# Patient Record
Sex: FEMALE | Race: WHITE | NOT HISPANIC OR LATINO | Employment: OTHER | URBAN - METROPOLITAN AREA
[De-identification: names, ages, dates, MRNs, and addresses within clinical notes are randomized per-mention and may not be internally consistent; named-entity substitution may affect disease eponyms.]

---

## 2017-12-04 ENCOUNTER — MEDICAL CORRESPONDENCE (OUTPATIENT)
Dept: HEALTH INFORMATION MANAGEMENT | Facility: CLINIC | Age: 62
End: 2017-12-04

## 2017-12-19 ENCOUNTER — OFFICE VISIT (OUTPATIENT)
Dept: PEDIATRIC HEMATOLOGY/ONCOLOGY | Facility: CLINIC | Age: 62
End: 2017-12-19
Attending: MEDICAL GENETICS
Payer: COMMERCIAL

## 2017-12-19 VITALS
SYSTOLIC BLOOD PRESSURE: 130 MMHG | BODY MASS INDEX: 29.31 KG/M2 | DIASTOLIC BLOOD PRESSURE: 71 MMHG | HEIGHT: 65 IN | WEIGHT: 175.93 LBS

## 2017-12-19 DIAGNOSIS — Q85.01 NEUROFIBROMATOSIS, PERIPHERAL, NF1 (H): Primary | ICD-10-CM

## 2017-12-19 DIAGNOSIS — Q85.01 NEUROFIBROMATOSIS, TYPE 1 (H): Primary | ICD-10-CM

## 2017-12-19 PROCEDURE — 96040 ZZH GENETIC COUNSELING, EACH 30 MINUTES: CPT | Mod: ZF | Performed by: GENETIC COUNSELOR, MS

## 2017-12-19 PROCEDURE — 88230 TISSUE CULTURE LYMPHOCYTE: CPT | Performed by: MEDICAL GENETICS

## 2017-12-19 PROCEDURE — 81408 MOPATH PROCEDURE LEVEL 9: CPT | Performed by: MEDICAL GENETICS

## 2017-12-19 PROCEDURE — 99214 OFFICE O/P EST MOD 30 MIN: CPT | Mod: ZF

## 2017-12-19 PROCEDURE — 84999 UNLISTED CHEMISTRY PROCEDURE: CPT | Performed by: MEDICAL GENETICS

## 2017-12-19 PROCEDURE — 36415 COLL VENOUS BLD VENIPUNCTURE: CPT | Performed by: MEDICAL GENETICS

## 2017-12-19 PROCEDURE — 81404 MOPATH PROCEDURE LEVEL 5: CPT | Performed by: MEDICAL GENETICS

## 2017-12-19 PROCEDURE — 81407 MOPATH PROCEDURE LEVEL 8: CPT | Performed by: MEDICAL GENETICS

## 2017-12-19 ASSESSMENT — PAIN SCALES - GENERAL: PAINLEVEL: NO PAIN (0)

## 2017-12-19 NOTE — PROGRESS NOTES
2017    Presenting Information:   I met with Abi Alex  for genetic counseling in  The neurofibromatosis clinic  at [facility] to discuss her personal and family history of neurofibromatosis type, 1( NF1).  She is here today to review this history, screening recommendations, and available genetic testing options. Josie Arellano, referred Abi for genetic testing.  Dr. Arellano did a physical exam today on Abi.    Personal History:  Abi is a 62 year old female.  She was diagnosed with NF1 when she had her first child, 44 years ago.  It was a clinical diagnosis at that time.  Dr. Arellano examined her today and she has a mild form of NF1.    Family History: (Please see scanned pedigree for detailed family history information)    Her father   at age 55 of sepsis. He was diagnosed with a brain tumor at age 44. It is not known if he had NF1.    A paternal uncle had tumors on his neck.  He  at age 60.    A paternal cousin  of breast cancer in her 60's.    Her daughter was diagnosed clinically with NF1 and by report has 4 children with cafe au lait spots. She is 44.    Her other daughter, age 42,  also has cafe au lait spots but it is not known if she has NF1.    Her mother  of breast cancer at age 50. She was diagnosed at age 49.    Her maternal grandmother  in her 70's from chronic leukemia.    Her brother has peroneal muscle atrophy. It is not known if he has NF1.  His daughter was diagnosed with Hodgkin's lymphoma in her 20's.    Her maternal  ethnicity is English, Burkinan and Sri [place]an. Her paternal ethnicity is Burkinan.   There is no reported consanguinity.    Discussion:    We briefly reviewed  the genetics and inheritance of NF1.  NF1 is an autosomal dominant condition, which means that a person only needs one copy of a gene with a mutation in the NF1 gene to have this condition. It also means that if a parent has this condition, each child has a 50% chance of inheriting the  same condition.      Genetic testing is available for this condition in NF1 gene.   The genes that are tested are the NF1 gene and the SPRED1 gene. The SPRED1 gene has cafe au lait spots and other features  similar to NF1.    The symptoms of NF1 can vary within families who have the same mutation in the NF1 gene. NF1 can be associated with cafe-au-lait spots, bony problems, tumors on the nerves, optic tumors in children and possibly brain tumors. It can also be associated with learning problems.    Testing:     Consent was obtained and genetic testing for NF1 and SPRED1 was sent to the [facility].     Abi needs to know if her insurance will cover the cost of the test before it is actually run.     Information from this test will determine the type of follow-up that Abi will need with NF1.    Plan:  1. Blood was sent to the [facility] to test for the NF1 and SPRED1 genes.  2. A prior authorization will be done to determine if the test is covered by insurance. Abi will be notified when this is complete.  3. I will contact Abi when testing is complete.      Christine Chau MS [facility]   Genetic Counselor  [phone]    Time spent in consultation face to face was 17 minutes.     CC: patient

## 2017-12-19 NOTE — MR AVS SNAPSHOT
After Visit Summary   12/19/2017    Abi Alex    MRN: [id number]           Patient Information     Date Of Birth          1955        Visit Information        Provider Department      12/19/2017 1:45 PM Pippa Arellano MD [facility] PEDS NEUROFIBROMATOSIS        Today's Diagnoses     Neurofibromatosis, type 1 (H)    -  1      Care Instructions    It was a pleasure to see you in our Neurofibromatosis clinic today.  Here's our recommendations for follow-up care:    Referrals/Tests:  Audiology Referral    Other Instructions:    Influenza vaccine every year in the fall.    Ophthalmology (eye MD) exam every year.    Annual physical with your Primary Care Provider.    Return to Clinic:  2 years, or sooner if needed. You will get a reminder letter to schedule this appointment.    ------------------------------------------------------------------------------------------------------------------------------    Neurofibromatosis (NF) Clinic  [facility], 9th Floor - [address] [address]  Scheduling/Appointments: [phone]  Fax: [phone]    Numbers to call:   Monday - Friday, 8:00 am - 5:00 pm:    Non-urgent or same-day call-back concerns: [facility] Nurse Triage - Voicemail: [phone]    Urgent concerns: NF Care Coordinator - Jennifer Magaña RN - Pager: [phone] (If you don't get a call back within 15-30 minutes, then Jennifer is off and you should call [phone]).    Scheduling/Appointments: [phone]  Nights and weekends:   Call [phone] and ask the  to page the 'Pediatric Heme/Onc fellow on call' if you have an urgent concern that can't wait until the clinic opens.  Genetic Counselor:  Christine Kandi, CGC: [phone]    Jennifer Magaña RN, MS  NF Care Coordinator  Pager: [phone]  E-mail: [email or web address]                  Follow-ups after your visit        Additional Services      AUDIOLOGY ADULT REFERRAL       Your provider has referred you to: [facility], [facility] [phone] Fax [phone]    Specialty Testing:  Audiogram w/ Tymps and Reflexes (Comprehensive Audiology Evaluation).                  Follow-up notes from your care team     Return in about 2 years (around 12/19/2019) for NF Clinic, NF1 Follow-Up,  Scheduling.      Your next 10 appointments already scheduled     Feb 01, 2018 10:00 AM CST   Walk In From ENT with Ant Crow   [facility] Audiology ([facility])    [address] [place] [id number] [phone]            Feb 01, 2018 11:15 AM CST   (Arrive by 11:00 AM)   New Patient Visit with Eloy Smiley MD   [facility] Ear Nose and Throat ([facility])    [address] [place] [id number] [phone]              Future tests that were ordered for you today     Open Future Orders        Priority Expected Expires Ordered    AUDIOLOGY ADULT REFERRAL Routine 12/26/2017 5/19/2018 12/19/2017            Who to contact     Please call your clinic at [phone] to:    Ask questions about your health    Make or cancel appointments    Discuss your medicines    Learn about your test results    Speak to your doctor   If you have compliments or concerns about an experience at your clinic, or if you wish to file a complaint, please contact [facility] Patient Relations at [phone] or email us at [email or web address]         Additional Information About Your Visit        Osmetechhart Information     WikiYou is an electronic gateway that provides easy, online access to your medical records. With WikiYou, you can request a clinic appointment, read your test results, renew a prescription or communicate with your care team.     To sign up for [employer] visit the website at [email or web address]   You will be  "asked to enter the access code listed below, as well as some personal information. Please follow the directions to create your username and password.     Your access code is: [email or web address]  Expires: 3/19/2018  3:35 PM     Your access code will  in 90 days. If you need help or a new code, please contact your [facility] or call [phone] for assistance.        Care EveryWhere ID     This is your Care EveryWhere ID. This could be used by other organizations to access your [place] medical records  [id number]        Your Vitals Were     Height Head Circumference BMI (Body Mass Index)             1.654 m (5' 5.12\") 54.6 cm (21.5\") 29.17 kg/m2          Blood Pressure from Last 3 Encounters:   17 130/71    Weight from Last 3 Encounters:   17 79.8 kg (175 lb 14.8 oz)               Primary Care Provider Office Phone # Fax #    Haley Jaime [phone]       [address] [address]        Equal Access to Services     [facility]: Hadii aad ku hadasho Soomaali, waaxda luqadaha, qaybta kaalmada adeegyada, tanmay white haybladimirn adeeg lorraine villegas . So [facility] [phone].    ATENCIÓN: Si habla español, tiene a aiken disposición servicios gratuitos de asistencia lingüística. Llame al [phone].    We comply with applicable federal civil rights laws and [place] laws. We do not discriminate on the basis of race, color, national origin, age, disability, sex, sexual orientation, or gender identity.            Thank you!     [facility]. Our goal is always to provide you with excellent care. Hearing back from our patients is one way we can continue to improve our services. Please take a few minutes to complete the written survey that you may receive in the mail after your visit with us. Thank you!             Your Updated Medication List - Protect others around you: Learn " how to safely use, store and throw away your medicines at www.disposemymeds.org.      Notice  As of 12/19/2017  3:35 PM    You have not been prescribed any medications.

## 2017-12-19 NOTE — PATIENT INSTRUCTIONS
It was a pleasure to see you in our Neurofibromatosis clinic today.  Here's our recommendations for follow-up care:    Referrals/Tests:  Audiology Referral    Other Instructions:    Influenza vaccine every year in the fall.    Ophthalmology (eye MD) exam every year.    Annual physical with your Primary Care Provider.    Return to Clinic:  2 years, or sooner if needed. You will get a reminder letter to schedule this appointment.    ------------------------------------------------------------------------------------------------------------------------------    Neurofibromatosis (NF) Clinic  [facility], 9th Floor - [address] [address]  Scheduling/Appointments: [phone]  Fax: [phone]    Numbers to call:   Monday - Friday, 8:00 am - 5:00 pm:    Non-urgent or same-day call-back concerns: [facility] Nurse Triage - Voicemail: [phone]    Urgent concerns: NF Care Coordinator - Jennifer Magaña RN - Pager: [phone] (If you don't get a call back within 15-30 minutes, then Jennifer is off and you should call [phone]).    Scheduling/Appointments: [phone]  Nights and weekends:   Call [phone] and ask the  to page the 'Pediatric Heme/Onc fellow on call' if you have an urgent concern that can't wait until the clinic opens.  Genetic Counselor:  Christine Chau CGC: [phone]    Jennifer Magaña RN, MS  NF Care Coordinator  Pager: [phone]  E-mail: [email or web address]

## 2017-12-19 NOTE — LETTER
2017    RE: Abi Alex  [address]  [place] [facility] [address]     2017    Presenting Information:   I met with Abi Alex  for genetic counseling in  The neurofibromatosis clinic  at [facility] to discuss her personal and family history of neurofibromatosis type, 1( NF1).  She is here today to review this history, screening recommendations, and available genetic testing options. Josie Arellano, referred Abi for genetic testing.  Dr. Arellano did a physical exam today on Abi.    Personal History:  Abi is a 62 year old female.  She was diagnosed with NF1 when she had her first child, 44 years ago.  It was a clinical diagnosis at that time.  Dr. Arellano examined her today and she has a mild form of NF1.    Family History: (Please see scanned pedigree for detailed family history information)    Her father   at age 55 of sepsis. He was diagnosed with a brain tumor at age 44. It is not known if he had NF1.    A paternal uncle had tumors on his neck.  He  at age 60.    A paternal cousin  of breast cancer in her 60's.    Her daughter was diagnosed clinically with NF1 and by report has 4 children with cafe au lait spots. She is 44.    Her other daughter, age 42,  also has cafe au lait spots but it is not known if she has NF1.    Her mother  of breast cancer at age 50. She was diagnosed at age 49.    Her maternal grandmother  in her 70's from chronic leukemia.    Her brother has peroneal muscle atrophy. It is not known if he has NF1.  His daughter was diagnosed with Hodgkin's lymphoma in her 20's.    Her maternal  ethnicity is English, Korean and Martiniquais. Her paternal ethnicity is Korean.   There is no reported consanguinity.    Discussion:    We briefly reviewed  the genetics and inheritance of NF1.  NF1 is an autosomal dominant condition, which means that a person only needs one copy of a gene with a mutation in the NF1 gene to have this condition. It also  means that if a parent has this condition, each child has a 50% chance of inheriting the same condition.      Genetic testing is available for this condition in NF1 gene.   The genes that are tested are the NF1 gene and the SPRED1 gene. The SPRED1 gene has cafe au lait spots and other features  similar to NF1.    The symptoms of NF1 can vary within families who have the same mutation in the NF1 gene. NF1 can be associated with cafe-au-lait spots, bony problems, tumors on the nerves, optic tumors in children and possibly brain tumors. It can also be associated with learning problems.    Testing:     Consent was obtained and genetic testing for NF1 and SPRED1 was sent to the [facility].     Abi needs to know if her insurance will cover the cost of the test before it is actually run.     Information from this test will determine the type of follow-up that Abi will need with NF1.    Plan:  1. Blood was sent to the [facility] to test for the NF1 and SPRED1 genes.  2. A prior authorization will be done to determine if the test is covered by insurance. Abi will be notified when this is complete.  3. I will contact Abi when testing is complete.      Christine Chau, MS [facility]   Genetic Counselor  [phone]    Time spent in consultation face to face was 17 minutes.     CC: patient

## 2017-12-19 NOTE — LETTER
2017      RE: Abi Alex  [address]  [place] [address]       NEUROFIBROMATOSIS CLINIC CONSULTATION     Name:  Abi Alex  :   1955  MRN:   [id number]  Date of service: Dec 19, 2017  Primary Provider: Haley Jaime  Referring Provider: Haley Jaime    Reason for consultation:  A consultation in the [facility] Neurofibromatosis Clinic was requested by Haley Jaime for Abi, a 62 year old female, for evaluation of her known diagnosis of neurofibromatosis.  . She also saw our genetic counselor and nurse coordinator at this visit.       Assessment:    Abi has neurofibromatosis. She has relatively mild skin manifestations with no neurofibroma  but has fairly extensive sun damage with generalized mentoring enhancement that mask café au lait spots on her trunk and arms.    In order to make a definitive diagnosis of Neurofibromatosis type 1 (NF1), 2 out of 7 possible criteria must be met.   1. 6 or more café au lait macules (5mm or great in prepubertal child)  2. axillary or inguinal freckling   3. Lisch nodules of the iris   4. optic glioma   5. 2 or more neurofibromata or 1 plexiform neurofibroma   6. characteristic bony lesion such as tibial pseudarthrosis   7. family history of NF1 in a first degree relative.     Abi satisfies criteria # 1,2,3, and 7 and thus merits a formal diagnosis of NF1.     NF1 occurs in approximately 3000 individuals, and for most it is a mild disorder that causes spots and some benign nerve sheath tumors (neurofibromata).  However, there are many complications that can occur in NF1.     The two most common complications are hypertension and scoliosis.  Today, we found a normal blood pressure on  Abi. We checked Abi s spine and found no scoliosis.      There are many other less common complications of NF1, and the only way to screen for these is with a complete review of symptoms and physical examination, both of  which were performed today and reveal no apparent problems. At Abi s age, I recommend surveillance for the complications of NF1 be done every 1-2 years.  I would be happy to provide this.     NF1 is inherited in autosomal dominant fashion.  Autosomal dominant inheritance was reviewed with Abi.  Approximately half of cases of NF1 are new in the family and represent new gene mutations, and half of cases are inherited from an affected parent.  Based on Abi's history, it seems likely that both of her daughters inherited this gene from her. We are happy to see them as well should that be desired    Gene testing is available for NF1.  It can detect 95% of mutations in the NF1 gene.  I discussed the merits and disadvantages of pursuing genetic testing with Abi and she chose to pursue NF1 mutation analysis.      Plan:     Abi expressed some concern about not being able to hear as well that she had been. Because of this, I recommended audiology assessment   Ordered at this visit:   Orders Placed This Encounter   Procedures     AUDIOLOGY ADULT REFERRAL     GENETIC COUNSELING SERVICES       Genetic counseling consultation with Christine Chau MS, [facility] to obtain a pedigree and for genetic counseling regarding autosomal dominant inheritance.   Return to the NF Clinic in 2 years for follow-up.      -----  History of Present Illness:  Patient Active Problem List   Diagnosis     Neurofibromatosis, type 1 (H)      Abi reports that she was diagnosed in about 1972 at the time of her first pregnancy. Her daughter had café au lait spots at birth and this observation,, coupled with her own physical exam prompted that diagnosis to be established many years ago. She herself has not had much of the way of complications of her condition. Recently, she went for physical examination and was noted to have skin tags underneath her breasts. She asked her premier provider of these could be related to her NF. While the skin tags proved not  to be, it was recommended that she be seen again for follow-up of her long-standing medical condition.    Abi expressed some concern about progress for her daughter Angeline. She reports that her daughter has a tumor behind her ear. Both Angeline and her other daughter also had café au lait spots.  .     Review of available medical records:  Pertinent studies/abnormal test results:  No previous testing related to NF  Imaging results:  No previous testing related to NF    Past Medical History:  Hospitalization History: Abi has not had medical hospitalization for the last 10 years    Surgical History: had a hysterectomy for prolonged bleeding in the early 1980s. She had previously had a tubal ligation as well.    Other health services currently received are primary care, no other specialty care    Review of Systems:  Constitional:  Regards herself as generally healthy  Eyes: negative - normal vision - wears glasses and these were checked about six months ago. She's not sure she sees as well now after receiving this new pair but is reluctant to seek follow-up because only one visit per year is paid for by her insurance  Ears/Nose/Throat: indicates she feels that she doesn't hear as well as she used to  Respiratory: negative  Cardiovascular: negative  Gastrointestinal: negative  Genitourinary: negative  Hematologic/Lymphatic: negative  Allergy/Immunologic: negative - no drug allergies  Musculoskeletal: negative  Endocrine:  On thyroxine for hypothyroidism  Integument:  See above - does not have neurofibromata  Neurologic:  Has occasional headaches. She thinks she could have had an MRI many years ago but does not recall any results specifically  Psychiatric: negative    Remainder of comprehensive review of systems is complete and negative.    Personal History  Family History:    A detailed pedigree was obtained at the time of this appointment and is available in the chart.  Family history is significant for other  "family members who may have NF. She questions whether her father might have had the condition; he had a brain tumor. His brother had neck tumors. As noted, her two daughters have café au lait spots..  Maternal ethnicity is  English, South Korean, and Portuguese.  Paternal ethnicity is  South Korean.  Consanguinity  denied.  Remainder of history was non-contributory.    Social History:    Abi lives at home with her . 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  Current insurance status commercial/private.     I have reviewed Abi s past medical history, family history, social history, medications and allergies as documented in the electronic medical record.  There were no additional findings except as noted.    Medications:  No current outpatient prescriptions on file.     Allergies:  No Known Allergies    Physical Examination:  Blood pressure 130/71, height 5' 5.12\" (165.4 cm), weight 175 lb 14.8 oz (79.8 kg), head circumference 54.6 cm (21.5\").  Constitutional: This was a well-developed, well nourished female who responded appropriately to all requests during the examination.    Head and Neck:  She had hair of normal texture and distribution and her head was proportionate in appearance.  The face was symmetric and did not have dysmorphic features.   Eyes:  The pupils were equal, round, and reacted to light.   The conjunctivae were clear.   Ears:  Her ears were normal in architecture and placement.   Nose: The nose was clear.    Mouth and Throat: The throat was without erythema.    Respiratory: The chest was clear to auscultation and had a symmetric appearance.    Cardiovascular:  On examination of the heart, the rhythm was regular and there was no murmur.  The peripheral pulses were normal.    Gastrointestinal: The abdomen was soft and had normal bowel sounds.  There was no " hepatosplenomegaly.    : I deferred a  examination.   Musculoskeletal: There was a full range of motion on the extremity exam, and normal muscular volume and bulk. There was no evidence of scoliosis.   Neurologic: The neurologic exam was normal, with normal cranial nerves, normal deep tendon reflexes, normal sensation, and a normal gait. She had normal tone.   Integument:  Abi has exaggerated sun damage over most of her trunk arms and lower legs.  She has café au lait spots easily seen on her upper thighs and lower back in areas not exposed as heavily to sun. She has freckling in her axillae and in her inguinal area. I did not palpate nor see obvious neurofibromata. She does not have areolar neurofibromas The nails were normal in architecture.  She had normal dermatoglyphics.  ---  JOE SOTO M.D.  Professor and Director   Division of Genetics and Metabolism  Department of Pediatrics  [facility]    Routed to family in Comm [place]  Also to  Haley Jaime Elizabeth A Koffel    Parent(s) of Abi Alex  [address]  [place] [address]

## 2017-12-19 NOTE — NURSING NOTE
"Chief Complaint   Patient presents with     New Patient     New patient here today for skin bumps/ family history of NF     /71 (BP Location: Left arm, Patient Position: Fowlers, Cuff Size: Adult Regular)  Ht 1.654 m (5' 5.12\")  Wt 79.8 kg (175 lb 14.8 oz)  HC 54.6 cm (21.5\")  BMI 29.17 kg/m2  Jessa De La Vega, ISAIAS  December 19, 2017    "

## 2017-12-19 NOTE — MR AVS SNAPSHOT
After Visit Summary   12/19/2017    Abi Alex    MRN: [id number]           Patient Information     Date Of Birth          1955        Visit Information        Provider Department      12/19/2017 1:45 PM Christine Chau GC [facility] PEDS NEUROFIBROMATOSIS        Today's Diagnoses     Neurofibromatosis, peripheral, NF1 (H)    -  1       Follow-ups after your visit        Your next 10 appointments already scheduled     Feb 01, 2018 10:00 AM CST   Walk In From ENT with Ant Crow   [facility] ([facility])    [address] [place] [id number] [phone]            Feb 01, 2018 11:15 AM CST   (Arrive by 11:00 AM)   New Patient Visit with Eloy Smiley MD   [facility] Nose [facility]roat ([facility])    [address] [place] [id number] [phone]              Future tests that were ordered for you today     Open Future Orders        Priority Expected Expires Ordered    AUDIOLOGY ADULT REFERRAL Routine 12/26/2017 5/19/2018 12/19/2017            Who to contact     Please call your clinic at [phone] to:    Ask questions about your health    Make or cancel appointments    Discuss your medicines    Learn about your test results    Speak to your doctor   If you have compliments or concerns about an experience at your clinic, or if you wish to file a complaint, please contact [facility] Patient Relations at [phone] or email us at [email or web address]         Additional Information About Your Visit        MyChart Information     BookitNow! is an electronic gateway that provides easy, online access to your medical records. With BookitNow!, you can request a clinic appointment, read your test results, renew a prescription or communicate with your care team.     To sign up for Solxt visit the website at [email or web address]   You will  be asked to enter the access code listed below, as well as some personal information. Please follow the directions to create your username and password.     Your access code is: [email or web address]  Expires: 3/19/2018  3:35 PM     Your access code will  in 90 days. If you need help or a new code, please contact your [facility] or call [phone] for assistance.        Care EveryWhere ID     This is your Care EveryWhere ID. This could be used by other organizations to access your [place] medical records  [id number]         Blood Pressure from Last 3 Encounters:   17 130/71    Weight from Last 3 Encounters:   17 79.8 kg (175 lb 14.8 oz)              We Performed the Following     Send outs misc test     [facility] NFSP1-R, NF1 and SPRED1 genes: Laboratory Miscellaneous Order        Primary Care Provider Office Phone # Fax #    Haley Jaime [phone]       [address] [address]        Equal Access to Services     MICHELLE MOORE : Hadii aad ku hadasho Soomaali, waaxda luqadaha, qaybta kaalmada adeegyada, tanmay villegas . So [facility] [phone].    ATENCIÓN: Si habla español, tiene a aiken disposición servicios gratuitos de asistencia lingüística. Florinda al [phone].    We comply with applicable federal civil rights laws and [place] laws. We do not discriminate on the basis of race, color, national origin, age, disability, sex, sexual orientation, or gender identity.            Thank you!     [facility] your care. Our goal is always to provide you with excellent care. Hearing back from our patients is one way we can continue to improve our services. Please take a few minutes to complete the written survey that you may receive in the mail after your visit with us. Thank you!             Your Updated Medication List - Protect others around  you: Learn how to safely use, store and throw away your medicines at www.disposemymeds.org.      Notice  As of 12/19/2017 11:59 PM    You have not been prescribed any medications.

## 2017-12-19 NOTE — PROGRESS NOTES
NEUROFIBROMATOSIS CLINIC CONSULTATION     Name:  Abi Alex  :   1955  MRN:   [id number]  Date of service: Dec 19, 2017  Primary Provider: Haley Jaime  Referring Provider: Haley Jaime    Reason for consultation:  A consultation in the [facility] Neurofibromatosis Clinic was requested by Haley Jaime for Abi, a 62 year old female, for evaluation of her known diagnosis of neurofibromatosis.  . She also saw our genetic counselor and nurse coordinator at this visit.       Assessment:    Abi has neurofibromatosis. She has relatively mild skin manifestations with no neurofibroma  but has fairly extensive sun damage with generalized mentoring enhancement that mask café au lait spots on her trunk and arms.    In order to make a definitive diagnosis of Neurofibromatosis type 1 (NF1), 2 out of 7 possible criteria must be met.   1. 6 or more café au lait macules (5mm or great in prepubertal child)  2. axillary or inguinal freckling   3. Lisch nodules of the iris   4. optic glioma   5. 2 or more neurofibromata or 1 plexiform neurofibroma   6. characteristic bony lesion such as tibial pseudarthrosis   7. family history of NF1 in a first degree relative.     Abi satisfies criteria # 1,2,3, and 7 and thus merits a formal diagnosis of NF1.     NF1 occurs in approximately 3000 individuals, and for most it is a mild disorder that causes spots and some benign nerve sheath tumors (neurofibromata).  However, there are many complications that can occur in NF1.     The two most common complications are hypertension and scoliosis.  Today, we found a normal blood pressure on  Abi. We checked Abi s spine and found no scoliosis.      There are many other less common complications of NF1, and the only way to screen for these is with a complete review of symptoms and physical examination, both of which were performed today and reveal no apparent problems. At Abi s age, I recommend  surveillance for the complications of NF1 be done every 1-2 years.  I would be happy to provide this.     NF1 is inherited in autosomal dominant fashion.  Autosomal dominant inheritance was reviewed with Abi.  Approximately half of cases of NF1 are new in the family and represent new gene mutations, and half of cases are inherited from an affected parent.  Based on Abi's history, it seems likely that both of her daughters inherited this gene from her. We are happy to see them as well should that be desired    Gene testing is available for NF1.  It can detect 95% of mutations in the NF1 gene.  I discussed the merits and disadvantages of pursuing genetic testing with Abi and she chose to pursue NF1 mutation analysis.      Plan:     Abi expressed some concern about not being able to hear as well that she had been. Because of this, I recommended audiology assessment   Ordered at this visit:   Orders Placed This Encounter   Procedures     AUDIOLOGY ADULT REFERRAL     GENETIC COUNSELING SERVICES       Genetic counseling consultation with Christine Chau [place] [facility] [place] and for genetic counseling regarding autosomal dominant inheritance.   Return to the NF Clinic in 2 years for follow-up.      -----  History of Present Illness:  Patient Active Problem List   Diagnosis     Neurofibromatosis, type 1 (H)      Abi reports that she was diagnosed in about 1972 at the time of her first pregnancy. Her daughter had café au lait spots at birth and this observation,, coupled with her own physical exam prompted that diagnosis to be established many years ago. She herself has not had much of the way of complications of her condition. Recently, she went for physical examination and was noted to have skin tags underneath her breasts. She asked her premier provider of these could be related to her NF. While the skin tags proved not to be, it was recommended that she be seen again for follow-up of her long-standing  medical condition.    Abi expressed some concern about progress for her daughter Angeline. She reports that her daughter has a tumor behind her ear. Both Angeline and her other daughter also had café au lait spots.  .     Review of available medical records:  Pertinent studies/abnormal test results:  No previous testing related to NF  Imaging results:  No previous testing related to NF    Past Medical History:  Hospitalization History: Abi has not had medical hospitalization for the last 10 years    Surgical History: had a hysterectomy for prolonged bleeding in the early 1980s. She had previously had a tubal ligation as well.    Other health services currently received are primary care, no other specialty care    Review of Systems:  Constitional:  Regards herself as generally healthy  Eyes: negative - normal vision - wears glasses and these were checked about six months ago. She's not sure she sees as well now after receiving this new pair but is reluctant to seek follow-up because only one visit per year is paid for by her insurance  Ears/Nose/Throat: indicates she feels that she doesn't hear as well as she used to  Respiratory: negative  Cardiovascular: negative  Gastrointestinal: negative  Genitourinary: negative  Hematologic/Lymphatic: negative  Allergy/Immunologic: negative - no drug allergies  Musculoskeletal: negative  Endocrine:  On thyroxine for hypothyroidism  Integument:  See above - does not have neurofibromata  Neurologic:  Has occasional headaches. She thinks she could have had an MRI many years ago but does not recall any results specifically  Psychiatric: negative    Remainder of comprehensive review of systems is complete and negative.    Personal History  Family History:    A detailed pedigree was obtained at the time of this appointment and is available in the chart.  Family history is significant for other family members who may have NF. She questions whether her father might have had the  "condition; he had a brain tumor. His brother had neck tumors. As noted, her two daughters have café au lait spots..  Maternal ethnicity is  English, [place], and [place].  Paternal ethnicity is  [place].  Consanguinity  denied.  Remainder of history was non-contributory.    Social History:    Abi lives at home with her . 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  Current insurance status commercial/private.     I have reviewed Abi s past medical history, family history, social history, medications and allergies as documented in the electronic medical record.  There were no additional findings except as noted.    Medications:  No current outpatient prescriptions on file.     Allergies:  No Known Allergies    Physical Examination:  Blood pressure 130/71, height 5' 5.12\" (165.4 cm), weight 175 lb 14.8 oz (79.8 kg), head circumference 54.6 cm (21.5\").  Constitutional: This was a well-developed, well nourished female who responded appropriately to all requests during the examination.    Head and Neck:  She had hair of normal texture and distribution and her head was proportionate in appearance.  The face was symmetric and did not have dysmorphic features.   Eyes:  The pupils were equal, round, and reacted to light.   The conjunctivae were clear.   Ears:  Her ears were normal in architecture and placement.   Nose: The nose was clear.    Mouth and Throat: The throat was without erythema.    Respiratory: The chest was clear to auscultation and had a symmetric appearance.    Cardiovascular:  On examination of the heart, the rhythm was regular and there was no murmur.  The peripheral pulses were normal.    Gastrointestinal: The abdomen was soft and had normal bowel sounds.  There was no hepatosplenomegaly.    : I deferred a  examination.   Musculoskeletal: There was a full range of " motion on the extremity exam, and normal muscular volume and bulk. There was no evidence of scoliosis.   Neurologic: The neurologic exam was normal, with normal cranial nerves, normal deep tendon reflexes, normal sensation, and a normal gait. She had normal tone.   Integument:  Abi has exaggerated sun damage over most of her trunk arms and lower legs.  She has café au lait spots easily seen on her upper thighs and lower back in areas not exposed as heavily to sun. She has freckling in her axillae and in her inguinal area. I did not palpate nor see obvious neurofibromata. She does not have areolar neurofibromas The nails were normal in architecture.  She had normal dermatoglyphics.  ---  JOE SOTO M.D.  Professor and Director   Division of Genetics and Metabolism  Department of Pediatrics  [facility]    Routed to family in [facility]  Also to  Haley Jaime Elizabeth A Koffel

## 2017-12-20 LAB — MISCELLANEOUS TEST: NORMAL

## 2018-01-08 ENCOUNTER — TELEPHONE (OUTPATIENT)
Dept: CONSULT | Facility: CLINIC | Age: 63
End: 2018-01-08

## 2018-01-08 NOTE — TELEPHONE ENCOUNTER
Notified Abi that we received prior authorization approval valid from 12/19/17 to 6/18/18. Authorization number is [id number]. Provided Abi with estimated out of pocket cost for testing. Abi expressed understanding and stated that she wants to continue with testing. We will call when results are available. Abi had no further questions.    Netta Celestin    Division of Genetics  [facility]  P: [phone]

## 2018-01-31 DIAGNOSIS — H90.8 MIXED HEARING LOSS: Primary | ICD-10-CM

## 2018-02-01 ENCOUNTER — OFFICE VISIT (OUTPATIENT)
Dept: AUDIOLOGY | Facility: CLINIC | Age: 63
End: 2018-02-01
Payer: COMMERCIAL

## 2018-02-01 ENCOUNTER — PRE VISIT (OUTPATIENT)
Dept: OTOLARYNGOLOGY | Facility: CLINIC | Age: 63
End: 2018-02-01

## 2018-02-01 ENCOUNTER — OFFICE VISIT (OUTPATIENT)
Dept: OTOLARYNGOLOGY | Facility: CLINIC | Age: 63
End: 2018-02-01
Payer: COMMERCIAL

## 2018-02-01 VITALS — HEIGHT: 65 IN | WEIGHT: 177 LBS | BODY MASS INDEX: 29.49 KG/M2

## 2018-02-01 DIAGNOSIS — Q85.01 NEUROFIBROMATOSIS, TYPE 1 (H): ICD-10-CM

## 2018-02-01 DIAGNOSIS — H90.3 SENSORINEURAL HEARING LOSS (SNHL) OF BOTH EARS: ICD-10-CM

## 2018-02-01 DIAGNOSIS — H93.13 TINNITUS OF BOTH EARS: ICD-10-CM

## 2018-02-01 DIAGNOSIS — H93.13 TINNITUS, BILATERAL: Primary | ICD-10-CM

## 2018-02-01 PROBLEM — G47.33 OSA ON CPAP: Status: ACTIVE | Noted: 2017-06-04

## 2018-02-01 RX ORDER — LEVOTHYROXINE SODIUM 125 UG/1
62.5 TABLET ORAL DAILY
COMMUNITY
Start: 2017-11-29

## 2018-02-01 RX ORDER — CHLORAL HYDRATE 500 MG
CAPSULE ORAL
COMMUNITY
Start: 2007-10-22

## 2018-02-01 RX ORDER — DIPHENOXYLATE HYDROCHLORIDE AND ATROPINE SULFATE 2.5; .025 MG/1; MG/1
TABLET ORAL
COMMUNITY
Start: 2007-10-22

## 2018-02-01 RX ORDER — TRIAMCINOLONE ACETONIDE 1 MG/G
OINTMENT TOPICAL
COMMUNITY
Start: 2017-09-22 | End: 2024-04-23

## 2018-02-01 ASSESSMENT — PAIN SCALES - GENERAL: PAINLEVEL: NO PAIN (0)

## 2018-02-01 NOTE — MR AVS SNAPSHOT
After Visit Summary   2/1/2018    Abi Alex    MRN: [id number]           Patient Information     Date Of Birth          1955        Visit Information        Provider Department      2/1/2018 11:15 AM Eloy Smiley MD [facility] Ear Nose [facility] Throat        Today's Diagnoses     Tinnitus, bilateral    -  1    Neurofibromatosis, type 1 (H)        Sensorineural hearing loss (SNHL) of both ears          Care Instructions    The patient presents with a history of sensorineural hearing loss, tinnitus and a history of neurofibromatosis.  The patient's physical examination shows no abnormalities of the ears.  Due to her family his of neurofibromatosis, she will be referred for an MRI scan of the head. She will have a repeat Audiogram and Tympanogram in one year to monitor her sensorineural hearing loss and she will avoid caffeine and high doses of aspirin products to minimize tinnitus. She will be seen again after her MRI scan of the head.           Follow-ups after your visit        Your next 10 appointments already scheduled     Feb 01, 2018 11:15 AM CST   (Arrive by 11:00 AM)   New Patient Visit with Eloy Smiley MD   [facility] ([facility])    [address] [address] [phone]            Feb 08, 2018  3:00 PM CST   (Arrive by 2:45 PM)   MR BRAIN W/O & W CONTRAST with UUMR2   [facility] [place] ([facility])    [address] [facility] [address] [phone]           Take your medicines as usual, unless your doctor tells you not to. Bring a list of your current medicines to your exam (including vitamins, minerals and over-the-counter drugs).  You will be given intravenous contrast for this exam. To prepare:   The day before your exam, drink extra fluids at least six 8-ounce glasses (unless your doctor tells you to restrict your  fluids).   Have a blood test (creatinine test) within 30 days of your exam. Go to your clinic or Diagnostic Imaging Department for this test.  The MRI machine uses a strong magnet. Please wear clothes without metal (snaps, zippers). A sweatsuit works well, or we may give you a hospital gown.  Please remove any body piercings and hair extensions before you arrive. You will also remove watches, jewelry, hairpins, wallets, dentures, partial dental plates and hearing aids. You may wear contact lenses, and you may be able to wear your rings. We have a safe place to keep your personal items, but it is safer to leave them at home.   **IMPORTANT** THE INSTRUCTIONS BELOW ARE ONLY FOR THOSE PATIENTS WHO HAVE BEEN TOLD THEY WILL RECEIVE SEDATION OR GENERAL ANESTHESIA DURING THEIR MRI PROCEDURE:  IF YOU WILL RECEIVE SEDATION (take medicine to help you relax during your exam):   You must get the medicine from your doctor before you arrive. Bring the medicine to the exam. Do not take it at home.   Arrive one hour early. Bring someone who can take you home after the test. Your medicine will make you sleepy. After the exam, you may not drive, take a bus or take a taxi by yourself.   No eating 8 hours before your exam. You may have clear liquids up until 4 hours before your exam. (Clear liquids include water, clear tea, black coffee and fruit juice without pulp.)  IF YOU WILL RECEIVE ANESTHESIA (be asleep for your exam):   Arrive 1 1/2 hours early. Bring someone who can take you home after the test. You may not drive, take a bus or take a taxi by yourself.   No eating 8 hours before your exam. You may have clear liquids up until 4 hours before your exam. (Clear liquids include water, clear tea, black coffee and fruit juice without pulp.)  Please call the Imaging Department at your exam site with any questions.            Feb 08, 2018  4:30 PM CST   (Arrive by 4:15 PM)   Return Visit with Eloy Smiley MD   [facility]  Nose and Throat ([facility])    [address] [id number] [phone]            Feb 01, 2019  9:00 AM CST   Walk In From ENT with Ant Phan [facility]iology ([facility])    [address] [place] [id number] [phone]            Feb 01, 2019 10:00 AM CST   (Arrive by 9:45 AM)   Return Visit with Eloy Smiley MD   [facility] Nose and Th[facility]at ([facility])    [address] [place] [id number] [phone]              Future tests that were ordered for you today     Open Future Orders        Priority Expected Expires Ordered    Urea nitrogen Routine  2/1/2019 2/1/2018    Creatinine Routine  2/1/2019 2/1/2018    MRI Brain and Skull Base w & w/o contrast Routine  2/1/2019 2/1/2018            Who to contact     Please call your clinic at [phone] to:    Ask questions about your health    Make or cancel appointments    Discuss your medicines    Learn about your test results    Speak to your doctor   If you have compliments or concerns about an experience at your clinic, or if you wish to file a complaint, please contact [facility] Patient Relations at [phone] or email us at [email or web address]         Additional Information About Your Visit        [email or web address] Information     SmartShoot gives you secure access to your electronic health record. If you see a primary care provider, you can also send messages to your care team and make appointments. If you have questions, please call your primary care clinic.  If you do not have a primary care provider, please call [phone] and they will assist you.      SmartShoot is an electronic gateway that provides easy, online access to your medical records. With SmartShoot, you can request a clinic appointment, read your test results, renew a prescription or  "communicate with your care team.     To access your existing account, please contact your [facility] or call [phone] for assistance.        Care EveryWhere ID     This is your Care EveryWhere ID. This could be used by other organizations to access your [place] medical records  [id number]        Your Vitals Were     Height BMI (Body Mass Index)                1.66 m (5' 5.35\") 29.14 kg/m2           Blood Pressure from Last 3 Encounters:   12/19/17 130/71    Weight from Last 3 Encounters:   02/01/18 80.3 kg (177 lb)   12/19/17 79.8 kg (175 lb 14.8 oz)              We Performed the Following     AUDIOLOGY ADULT REFERRAL        Primary Care Provider Office Phone # Fax #    Haley Jaime [phone]       [address] [address]        Equal Access to Services     [facility] : Hadii aad ku hadasho Soomaali, waaxda luqadaha, qaybta kaalmada adeegyada, waxay idiin hayaan ademaryann kharash laAmyaan . So [facility] [phone].    ATENCIÓN: Si habla español, tiene a aiken disposición servicios gratuitos de asistencia lingüística. Florinda al [phone].    We comply with applicable federal civil rights laws and [place] laws. We do not discriminate on the basis of race, color, national origin, age, disability, sex, sexual orientation, or gender identity.            Thank you!     Thank you for choosing [facility] EAR NOSE AND THROAT  for your care. Our goal is always to provide you with excellent care. Hearing back from our patients is one way we can continue to improve our services. Please take a few minutes to complete the written survey that you may receive in the mail after your visit with us. Thank you!             Your Updated Medication List - Protect others around you: Learn how to safely use, store and throw away your medicines at www.disposemymeds.org.          This list is accurate as of 2/1/18 11:07 AM.  Always use your most recent " med list.                   Brand Name Dispense Instructions for use Diagnosis    Calcium-D 600-400 MG-UNIT Tabs           fish oil-omega-3 fatty acids 1000 MG capsule           levothyroxine 125 MCG tablet    SYNTHROID/LEVOTHROID     Take 62.5 mcg by mouth        MULTI-VITAMINS Tabs           triamcinolone 0.1 % ointment    KENALOG

## 2018-02-01 NOTE — LETTER
2/1/2018       RE: Abi Alex  [address]  [facility] [address]     Dear Colleague,    Thank you for referring your patient, Abi Alex, to the [facility] EAR NOSE AND THROAT at [facility]. Please see a copy of my visit note below.    The patient presents with a history of hearing loss and a family history of neurofibromatosis. The patient is also having difficulty with bilateral tinnitus.  The patient reports a progressive hearing loss in both ears over at least the past few years.  The patient denies ear infections, otalgia, otorrhea or dizziness. The patient denies sinusitis, rhinitis, facial pain, nasal obstruction or purulent nasal discharge. The patient denies chronic or recurrent tonsillitis, chronic or recurrent pharyngitis. Her Audiogram and Tympanogram are reviewed with her and they demonstrate bilateral mild sensorineural hearing loss that is symmetric with 96% word recognition in the left ear and 100% word recognition in the right ear. Her tympanogram are normal.     This patient is seen in consultation at the request of Dr. Pippa Arellano.     All other systems were reviewed and they are either negative or they are not directly pertinent to this Otolaryngology examination.     Past Medical History:    Past Medical History:   Diagnosis Date     Bone disease     Mild bone loss     Hearing problem     Unsure of start date     Neurofibromatosis, type 1 (H) 12/19/2017     Sleep apnea 01/01/2007    Per my      Thyroid disease     Hypothyroidism, a long, long time     Tinnitus 01/01/2007    More noticeable over time, start date is a guess     Past Surgical History:  Past Surgical History:   Procedure Laterality Date     GYN SURGERY  1981    Hysterectomy     NASAL/SINUS POLYPECTOMY  05/01/1956    I know I was only two.     ORTHOPEDIC SURGERY      Bunion surgeries     Medications:  Current Outpatient Prescriptions:      Calcium Carbonate-Vitamin D  (CALCIUM-D) 600-400 MG-UNIT TABS, , Disp: , Rfl:      fish oil-omega-3 fatty acids 1000 MG capsule, , Disp: , Rfl:      levothyroxine (SYNTHROID/LEVOTHROID) 125 MCG tablet, Take 62.5 mcg by mouth, Disp: , Rfl:      Multiple Vitamin (MULTI-VITAMINS) TABS, , Disp: , Rfl:      triamcinolone (KENALOG) 0.1 % ointment, , Disp: , Rfl:     Allergies:  Review of patient's allergies indicates no known allergies.    Physical Examination:  The patient is a well developed, well nourished female in no apparent distress.  She is normocephalic, atraumatic with pupils equally round and reactive to light.    Oral Cavity Examination:  Normal mucosa with no masses or lesions  Nasal Examination: Normal mucosa with no masses or lesions  Ear Examination: Ear canals clear, tympanic membranes and middle ear spaces normal  Neurological Examination: Facial nerve function intact and symmetric  Integumentary Examination: No lesions on the skin of the head and neck  Neck Examination: No masses or lesions, no lymphadenopathy  Endocrine Examination: Normal thyroid examination    Assessment and Plan:    The patient presents with a history of sensorineural hearing loss, tinnitus and a history of neurofibromatosis.  The patient's physical examination shows no abnormalities of the ears.  Due to her family his of neurofibromatosis, she will be referred for an MRI scan of the head. She will have a repeat Audiogram and Tympanogram in one year to monitor her sensorineural hearing loss and she will avoid caffeine and high doses of aspirin products to minimize tinnitus. She will be seen again after her MRI scan of the head.     CC: Dr. Pippa Arellano    Again, thank you for allowing me to participate in the care of your patient.      Sincerely,    Eloy Smiley MD

## 2018-02-01 NOTE — PATIENT INSTRUCTIONS
The patient presents with a history of sensorineural hearing loss, tinnitus and a history of neurofibromatosis.  The patient's physical examination shows no abnormalities of the ears.  Due to her family his of neurofibromatosis, she will be referred for an MRI scan of the head. She will have a repeat Audiogram and Tympanogram in one year to monitor her sensorineural hearing loss and she will avoid caffeine and high doses of aspirin products to minimize tinnitus. She will be seen again after her MRI scan of the head.

## 2018-02-01 NOTE — PROGRESS NOTES
The patient presents with a history of hearing loss and a family history of neurofibromatosis. The patient is also having difficulty with bilateral tinnitus.  The patient reports a progressive hearing loss in both ears over at least the past few years.  The patient denies ear infections, otalgia, otorrhea or dizziness. The patient denies sinusitis, rhinitis, facial pain, nasal obstruction or purulent nasal discharge. The patient denies chronic or recurrent tonsillitis, chronic or recurrent pharyngitis. Her Audiogram and Tympanogram are reviewed with her and they demonstrate bilateral mild sensorineural hearing loss that is symmetric with 96% word recognition in the left ear and 100% word recognition in the right ear. Her tympanogram are normal.     This patient is seen in consultation at the request of Dr. Pippa Arellano.     All other systems were reviewed and they are either negative or they are not directly pertinent to this Otolaryngology examination.     Past Medical History:    Past Medical History:   Diagnosis Date     Bone disease     Mild bone loss     Hearing problem     Unsure of start date     Neurofibromatosis, type 1 (H) 12/19/2017     Sleep apnea 01/01/2007    Per my      Thyroid disease     Hypothyroidism, a long, long time     Tinnitus 01/01/2007    More noticeable over time, start date is a guess       Past Surgical History:    Past Surgical History:   Procedure Laterality Date     GYN SURGERY  1981    Hysterectomy     NASAL/SINUS POLYPECTOMY  05/01/1956    I know I was only two.     ORTHOPEDIC SURGERY      Bunion surgeries       Medications:      Current Outpatient Prescriptions:      Calcium Carbonate-Vitamin D (CALCIUM-D) 600-400 MG-UNIT TABS, , Disp: , Rfl:      fish oil-omega-3 fatty acids 1000 MG capsule, , Disp: , Rfl:      levothyroxine (SYNTHROID/LEVOTHROID) 125 MCG tablet, Take 62.5 mcg by mouth, Disp: , Rfl:      Multiple Vitamin (MULTI-VITAMINS) TABS, , Disp: , Rfl:       triamcinolone (KENALOG) 0.1 % ointment, , Disp: , Rfl:     Allergies:    Review of patient's allergies indicates no known allergies.    Physical Examination:    The patient is a well developed, well nourished female in no apparent distress.  She is normocephalic, atraumatic with pupils equally round and reactive to light.    Oral Cavity Examination:  Normal mucosa with no masses or lesions  Nasal Examination: Normal mucosa with no masses or lesions  Ear Examination: Ear canals clear, tympanic membranes and middle ear spaces normal  Neurological Examination: Facial nerve function intact and symmetric  Integumentary Examination: No lesions on the skin of the head and neck  Neck Examination: No masses or lesions, no lymphadenopathy  Endocrine Examination: Normal thyroid examination    Assessment and Plan:    The patient presents with a history of sensorineural hearing loss, tinnitus and a history of neurofibromatosis.  The patient's physical examination shows no abnormalities of the ears.  Due to her family his of neurofibromatosis, she will be referred for an MRI scan of the head. She will have a repeat Audiogram and Tympanogram in one year to monitor her sensorineural hearing loss and she will avoid caffeine and high doses of aspirin products to minimize tinnitus. She will be seen again after her MRI scan of the head.     CC: Dr. Pippa Arellano

## 2018-02-01 NOTE — PROGRESS NOTES
AUDIOLOGY REPORT    SUMMARY: Audiology visit completed. See audiogram for results.      RECOMMENDATIONS: Follow-up with ENT.    Justin Farmer.  Licensed Audiologist  [place] #[id number]

## 2018-02-01 NOTE — MR AVS SNAPSHOT
After Visit Summary   2/1/2018    Abi Alex    MRN: [id number]           Patient Information     Date Of Birth          1955        Visit Information        Provider Department      2/1/2018 10:00 AM Wendy Vargas AuD M [facility] Audiology        Today's Diagnoses     Sensorineural hearing loss (SNHL) of both ears        Tinnitus of both ears           Follow-ups after your visit        Your next 10 appointments already scheduled     Feb 08, 2018  3:00 PM CST   (Arrive by 2:45 PM)   MR BRAIN W/O & W CONTRAST with UUMR2   [facility] [place], MRI ([facility])    [address] [address] [phone]           Take your medicines as usual, unless your doctor tells you not to. Bring a list of your current medicines to your exam (including vitamins, minerals and over-the-counter drugs).  You will be given intravenous contrast for this exam. To prepare:   The day before your exam, drink extra fluids at least six 8-ounce glasses (unless your doctor tells you to restrict your fluids).   Have a blood test (creatinine test) within 30 days of your exam. Go to your clinic or Diagnostic Imaging Department for this test.  The MRI machine uses a strong magnet. Please wear clothes without metal (snaps, zippers). A sweatsuit works well, or we may give you a hospital gown.  Please remove any body piercings and hair extensions before you arrive. You will also remove watches, jewelry, hairpins, wallets, dentures, partial dental plates and hearing aids. You may wear contact lenses, and you may be able to wear your rings. We have a safe place to keep your personal items, but it is safer to leave them at home.   **IMPORTANT** THE INSTRUCTIONS BELOW ARE ONLY FOR THOSE PATIENTS WHO HAVE BEEN TOLD THEY WILL RECEIVE SEDATION OR GENERAL ANESTHESIA DURING THEIR MRI PROCEDURE:  IF YOU WILL RECEIVE SEDATION (take medicine to help you relax during  your exam):   You must get the medicine from your doctor before you arrive. Bring the medicine to the exam. Do not take it at home.   Arrive one hour early. Bring someone who can take you home after the test. Your medicine will make you sleepy. After the exam, you may not drive, take a bus or take a taxi by yourself.   No eating 8 hours before your exam. You may have clear liquids up until 4 hours before your exam. (Clear liquids include water, clear tea, black coffee and fruit juice without pulp.)  IF YOU WILL RECEIVE ANESTHESIA (be asleep for your exam):   Arrive 1 1/2 hours early. Bring someone who can take you home after the test. You may not drive, take a bus or take a taxi by yourself.   No eating 8 hours before your exam. You may have clear liquids up until 4 hours before your exam. (Clear liquids include water, clear tea, black coffee and fruit juice without pulp.)  Please call the Imaging Department at your exam site with any questions.            Feb 08, 2018  4:30 PM CST   (Arrive by 4:15 PM)   Return Visit with Eloy Smiley MD   [facility] Ear Nose and Throat ([facility])    [address] [place] [id number] [phone]            Feb 01, 2019  9:00 AM CST   Walk In From ENT with Ant Phan   [facility] Audiology ([facility])    [address] [place] [address]   [phone]            Feb 01, 2019 10:00 AM CST   (Arrive by 9:45 AM)   Return Visit with Eloy Smiley MD   [facility] Ear Nose and Throat ([facility])    [address] [address] [phone]              Future tests that were ordered for you today     Open Future Orders        Priority Expected Expires Ordered    Urea nitrogen Routine  2/1/2019 2/1/2018    Creatinine Routine  2/1/2019 2/1/2018    MRI Brain and Skull Base w & w/o contrast Routine   2/1/2019 2/1/2018            Who to contact     Please call your clinic at [phone] to:    Ask questions about your health    Make or cancel appointments    Discuss your medicines    Learn about your test results    Speak to your doctor   If you have compliments or concerns about an experience at your clinic, or if you wish to file a complaint, please contact [facility] Patient Relations at [phone] or email us at [email or web address]         Additional Information About Your Visit        [employer] Information     [facility] gives you secure access to your electronic health record. If you see a primary care provider, you can also send messages to your care team and make appointments. If you have questions, please call your primary care clinic.  If you do not have a primary care provider, please call [phone] and they will assist you.      [facility] is an electronic gateway that provides easy, online access to your medical records. With [facility], you can request a clinic appointment, read your test results, renew a prescription or communicate with your care team.     To access your existing account, please contact your [facility] or call [phone] for assistance.        Care EveryWhere ID     This is your Care EveryWhere ID. This could be used by other organizations to access your [facility] medical records  [id number]         Blood Pressure from Last 3 Encounters:   12/19/17 130/71    Weight from Last 3 Encounters:   02/01/18 80.3 kg (177 lb)   12/19/17 79.8 kg (175 lb 14.8 oz)              We Performed the Following     AUDIOGRAM/TYMPANOGRAM - INTERFACE     [facility] Eval [facility] Speech Recog ([address])     Tymps / Reflex   ([id number])        Primary Care Provider Office Phone # Fax #    Haley NGUYEN Yeni [phone]       [address] [address]        Equal Access to Services      MICHELLE MOORE : Hadii aad caitlin rebeca Barrios, waraudelda luqadaha, qaybta kaalniki saravananhomamichelle, waxisi cindy haysonido cohentonydiego jha. So [facility] [phone].    ATENCIÓN: Si habla español, tiene a aiken disposición servicios gratuitos de asistencia lingüística. Llame al [phone].    We comply with applicable federal civil rights laws and [place] laws. We do not discriminate on the basis of race, color, national origin, age, disability, sex, sexual orientation, or gender identity.            Thank you!     Thank you for choosing [facility] AUDIOLOGY  for your care. Our goal is always to provide you with excellent care. Hearing back from our patients is one way we can continue to improve our services. Please take a few minutes to complete the written survey that you may receive in the mail after your visit with us. Thank you!             Your Updated Medication List - Protect others around you: Learn how to safely use, store and throw away your medicines at www.disposemymeds.org.          This list is accurate as of 2/1/18 11:42 AM.  Always use your most recent med list.                   Brand Name Dispense Instructions for use Diagnosis    Calcium-D 600-400 MG-UNIT Tabs           fish oil-omega-3 fatty acids 1000 MG capsule           levothyroxine 125 MCG tablet    SYNTHROID/LEVOTHROID     Take 62.5 mcg by mouth        MULTI-VITAMINS Tabs           triamcinolone 0.1 % ointment    KENALOG

## 2018-02-01 NOTE — NURSING NOTE
"Chief Complaint   Patient presents with     Consult     hearing loss      Height 1.66 m (5' 5.35\"), weight 80.3 kg (177 lb).    Stephen Faye LPN    "

## 2018-02-02 ENCOUNTER — HEALTH MAINTENANCE LETTER (OUTPATIENT)
Age: 63
End: 2018-02-02

## 2018-02-08 ENCOUNTER — OFFICE VISIT (OUTPATIENT)
Dept: OTOLARYNGOLOGY | Facility: CLINIC | Age: 63
End: 2018-02-08
Payer: COMMERCIAL

## 2018-02-08 ENCOUNTER — HOSPITAL ENCOUNTER (OUTPATIENT)
Dept: MRI IMAGING | Facility: CLINIC | Age: 63
Discharge: HOME | End: 2018-02-08
Attending: OTOLARYNGOLOGY | Admitting: OTOLARYNGOLOGY
Payer: COMMERCIAL

## 2018-02-08 DIAGNOSIS — H93.13 TINNITUS, BILATERAL: ICD-10-CM

## 2018-02-08 DIAGNOSIS — H90.3 SENSORINEURAL HEARING LOSS (SNHL) OF BOTH EARS: ICD-10-CM

## 2018-02-08 DIAGNOSIS — H90.3 SENSORINEURAL HEARING LOSS (SNHL) OF BOTH EARS: Primary | ICD-10-CM

## 2018-02-08 DIAGNOSIS — Q85.01 NEUROFIBROMATOSIS, TYPE 1 (H): ICD-10-CM

## 2018-02-08 PROCEDURE — 25000128 H RX IP 250 OP 636: Performed by: OTOLARYNGOLOGY

## 2018-02-08 PROCEDURE — 70553 MRI BRAIN STEM W/O & W/DYE: CPT

## 2018-02-08 PROCEDURE — A9585 GADOBUTROL INJECTION: HCPCS | Performed by: OTOLARYNGOLOGY

## 2018-02-08 RX ORDER — GADOBUTROL 604.72 MG/ML
7.5 INJECTION INTRAVENOUS ONCE
Status: COMPLETED | OUTPATIENT
Start: 2018-02-08 | End: 2018-02-08

## 2018-02-08 RX ADMIN — GADOBUTROL 7.5 ML: 604.72 INJECTION INTRAVENOUS at 15:33

## 2018-02-08 ASSESSMENT — PAIN SCALES - GENERAL: PAINLEVEL: NO PAIN (0)

## 2018-02-08 NOTE — LETTER
2/8/2018       RE: Abi Alex  [address]  [facility] [address]     Dear Colleague,    Thank you for referring your patient, Abi Alex, to the [facility] EAR NOSE AND THROAT at [facility]. Please see a copy of my visit note below.    The patient presents with a history of hearing loss and a family history of neurofibromatosis. The patient is also having difficulty with bilateral tinnitus.  The patient reports a progressive hearing loss in both ears over at least the past few years.  Her Audiogram and Tympanogram are reviewed with her and they demonstrate bilateral mild sensorineural hearing loss that is symmetric with 96% word recognition in the left ear and 100% word recognition in the right ear. Her tympanogram are normal. Her MRI scan of the head demonstrates no retrocochlear pathology or intracranial pathology and these findings are discussed with her.       All other systems were reviewed and they are either negative or they are not directly pertinent to this Otolaryngology examination.     Past Medical History:    Past Medical History:   Diagnosis Date     Bone disease     Mild bone loss     Hearing problem     Unsure of start date     Neurofibromatosis, type 1 (H) 12/19/2017     Sleep apnea 01/01/2007    Per my      Thyroid disease     Hypothyroidism, a long, long time     Tinnitus 01/01/2007    More noticeable over time, start date is a guess       Past Surgical History:    Past Surgical History:   Procedure Laterality Date     GYN SURGERY  1981    Hysterectomy     NASAL/SINUS POLYPECTOMY  05/01/1956    I know I was only two.     ORTHOPEDIC SURGERY      Bunion surgeries       Medications:      Current Outpatient Prescriptions:      Calcium Carbonate-Vitamin D (CALCIUM-D) 600-400 MG-UNIT TABS, , Disp: , Rfl:      fish oil-omega-3 fatty acids 1000 MG capsule, , Disp: , Rfl:      levothyroxine (SYNTHROID/LEVOTHROID) 125 MCG tablet, Take  62.5 mcg by mouth, Disp: , Rfl:      Multiple Vitamin (MULTI-VITAMINS) TABS, , Disp: , Rfl:      triamcinolone (KENALOG) 0.1 % ointment, , Disp: , Rfl:   No current facility-administered medications for this visit.     Allergies:    Review of patient's allergies indicates no known allergies.    Physical Examination:    The patient is a well developed, well nourished female in no apparent distress.  She is normocephalic, atraumatic with pupils equally round and reactive to light.    Oral Cavity Examination:  Normal mucosa with no masses or lesions  Nasal Examination: Normal mucosa with no masses or lesions  Neurological Examination: Facial nerve function intact and symmetric  Integumentary Examination: No lesions on the skin of the head and neck    Assessment and Plan:    The patient presents with a history of sensorineural hearing loss, tinnitus and a history of neurofibromatosis.  The patient's physical examination shows no abnormalities of the ears.  Due to her family his of neurofibromatosis, she was referred for an MRI scan of the head. The scan demonstrates no retrocochlear pathology or intracranial pathology. She will be seen again in one year for review of a repeat Audiogram and Tympanogram.     CC: Dr. Pippa Arellano    Again, thank you for allowing me to participate in the care of your patient.      Sincerely,    Eloy Smiley MD

## 2018-02-08 NOTE — PROGRESS NOTES
The patient presents with a history of hearing loss and a family history of neurofibromatosis. The patient is also having difficulty with bilateral tinnitus.  The patient reports a progressive hearing loss in both ears over at least the past few years.  Her Audiogram and Tympanogram are reviewed with her and they demonstrate bilateral mild sensorineural hearing loss that is symmetric with 96% word recognition in the left ear and 100% word recognition in the right ear. Her tympanogram are normal. Her MRI scan of the head demonstrates no retrocochlear pathology or intracranial pathology and these findings are discussed with her.       All other systems were reviewed and they are either negative or they are not directly pertinent to this Otolaryngology examination.     Past Medical History:    Past Medical History:   Diagnosis Date     Bone disease     Mild bone loss     Hearing problem     Unsure of start date     Neurofibromatosis, type 1 (H) 12/19/2017     Sleep apnea 01/01/2007    Per my      Thyroid disease     Hypothyroidism, a long, long time     Tinnitus 01/01/2007    More noticeable over time, start date is a guess       Past Surgical History:    Past Surgical History:   Procedure Laterality Date     GYN SURGERY  1981    Hysterectomy     NASAL/SINUS POLYPECTOMY  05/01/1956    I know I was only two.     ORTHOPEDIC SURGERY      Bunion surgeries       Medications:      Current Outpatient Prescriptions:      Calcium Carbonate-Vitamin D (CALCIUM-D) 600-400 MG-UNIT TABS, , Disp: , Rfl:      fish oil-omega-3 fatty acids 1000 MG capsule, , Disp: , Rfl:      levothyroxine (SYNTHROID/LEVOTHROID) 125 MCG tablet, Take 62.5 mcg by mouth, Disp: , Rfl:      Multiple Vitamin (MULTI-VITAMINS) TABS, , Disp: , Rfl:      triamcinolone (KENALOG) 0.1 % ointment, , Disp: , Rfl:   No current facility-administered medications for this visit.     Allergies:    Review of patient's allergies indicates no known  allergies.    Physical Examination:    The patient is a well developed, well nourished female in no apparent distress.  She is normocephalic, atraumatic with pupils equally round and reactive to light.    Oral Cavity Examination:  Normal mucosa with no masses or lesions  Nasal Examination: Normal mucosa with no masses or lesions  Neurological Examination: Facial nerve function intact and symmetric  Integumentary Examination: No lesions on the skin of the head and neck    Assessment and Plan:    The patient presents with a history of sensorineural hearing loss, tinnitus and a history of neurofibromatosis.  The patient's physical examination shows no abnormalities of the ears.  Due to her family his of neurofibromatosis, she was referred for an MRI scan of the head. The scan demonstrates no retrocochlear pathology or intracranial pathology. She will be seen again in one year for review of a repeat Audiogram and Tympanogram.     CC: Dr. Pippa Arellano

## 2018-02-08 NOTE — NURSING NOTE
Chief Complaint   Patient presents with     RECHECK     hearing loss . Follow up after MRI     Stephen Faye LPN

## 2018-02-08 NOTE — MR AVS SNAPSHOT
After Visit Summary   2/8/2018    Abi Alex    MRN: [id number]           Patient Information     Date Of Birth          1955        Visit Information        Provider Department      2/8/2018 4:30 PM Eloy Smiley MD [facility] Ear Nose and Throat        Today's Diagnoses     Sensorineural hearing loss (SNHL) of both ears    -  1    Tinnitus, bilateral          Care Instructions    The patient presents with a history of sensorineural hearing loss, tinnitus and a history of neurofibromatosis.  The patient's physical examination shows no abnormalities of the ears.  Due to her family his of neurofibromatosis, she was referred for an MRI scan of the head. The scan demonstrates no retrocochlear pathology or intracranial pathology. She will be seen again in one year for review of a repeat Audiogram and Tympanogram.           Follow-ups after your visit        Your next 10 appointments already scheduled     Feb 08, 2018  4:30 PM CST   (Arrive by 4:15 PM)   Return Visit with MD SUDHA [facility] Ear Nose and Throat ([facility])    [address] [place] [address] [id number] [phone]            Feb 01, 2019  9:00 AM CST   Walk In From ENT with Ant Phan   [facility] Audiology ([facility])    [address] [place] [address] [id number] [phone]            Feb 01, 2019 10:00 AM CST   (Arrive by 9:45 AM)   Return Visit with Eloy Smiley MD   [facility] Ear Nose and Throat ([facility])    [address] [place] [address] [id number] [phone]              Who to contact     Please call your clinic at [phone] to:    Ask questions about your health    Make or cancel appointments    Discuss your medicines    Learn about your test results    Speak to your doctor            Additional Information About Your Visit         Experts 911hart Information     TRUE linkswear gives you secure access to your electronic health record. If you see a primary care provider, you can also send messages to your care team and make appointments. If you have questions, please call your primary care clinic.  If you do not have a primary care provider, please call [phone] and they will assist you.      TRUE linkswear is an electronic gateway that provides easy, online access to your medical records. With TRUE linkswear, you can request a clinic appointment, read your test results, renew a prescription or communicate with your care team.     To access your existing account, please contact your [facility] or call [phone] for assistance.        Care EveryWhere ID     This is your Care EveryWhere ID. This could be used by other organizations to access your [place] medical records  [id number]         Blood Pressure from Last 3 Encounters:   12/19/17 130/71    Weight from Last 3 Encounters:   02/01/18 80.3 kg (177 lb)   12/19/17 79.8 kg (175 lb 14.8 oz)              We Performed the Following     AUDIO REVIEW/CONSULT        Primary Care Provider Office Phone # Fax #    Haley Jaime [phone]       [place] [address]        Equal Access to Services     MICHELLE MOORE : Hadii aad ku hadasho Soomaali, waaxda luqadaha, qaybta kaalmada adeegyada, waxay idiin haybladimirn izabela peters lajosh jha. So [facility] [phone].    ATENCIÓN: Si habla español, tiene a aiken disposición servicios gratuitos de asistencia lingüística. Llame al [phone].    We comply with applicable federal civil rights laws and [place] laws. We do not discriminate on the basis of race, color, national origin, age, disability, sex, sexual orientation, or gender identity.            Thank you!     Thank you for choosing [facility] EAR NOSE AND THROAT  for your care. Our goal is always to provide you with excellent care. Hearing  back from our patients is one way we can continue to improve our services. Please take a few minutes to complete the written survey that you may receive in the mail after your visit with us. Thank you!             Your Updated Medication List - Protect others around you: Learn how to safely use, store and throw away your medicines at www.disposemymeds.org.          This list is accurate as of 2/8/18  4:12 PM.  Always use your most recent med list.                   Brand Name Dispense Instructions for use Diagnosis    Calcium-D 600-400 MG-UNIT Tabs           fish oil-omega-3 fatty acids 1000 MG capsule           levothyroxine 125 MCG tablet    SYNTHROID/LEVOTHROID     Take 62.5 mcg by mouth        MULTI-VITAMINS Tabs           triamcinolone 0.1 % ointment    KENALOG

## 2018-02-08 NOTE — PATIENT INSTRUCTIONS
The patient presents with a history of sensorineural hearing loss, tinnitus and a history of neurofibromatosis.  The patient's physical examination shows no abnormalities of the ears.  Due to her family his of neurofibromatosis, she was referred for an MRI scan of the head. The scan demonstrates no retrocochlear pathology or intracranial pathology. She will be seen again in one year for review of a repeat Audiogram and Tympanogram.

## 2018-03-26 ENCOUNTER — TELEPHONE (OUTPATIENT)
Dept: PEDIATRIC HEMATOLOGY/ONCOLOGY | Facility: CLINIC | Age: 63
End: 2018-03-26

## 2018-03-26 LAB — LAB SCANNED RESULT: NORMAL

## 2018-03-26 NOTE — TELEPHONE ENCOUNTER
Date of phone call: 3/26/18    I spoke with Abi today on the phone. She had her blood drawn on 12/19/17 for NF1/SPRED1 NGS  testing at the [facility]. 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    Result:  Negative- no mutations identified in the NF1 or SPRED1 genes    Interpretation:  1. It may be that Abi has another mutation in the NF1 gene that is not detectable.  2. She may have another condition similar in symptoms to NF1 but is associated with a different gene or not genetic.    Option for additional testing-RNA studies:  Because this was an unexpected result (Abi has a clinical diagnosis of NF1), I talked to the genetic counselor at the [facility]. She suggested doing RNA studies on the NF1 gene in Abi and her relatives.      This would involve blood draws from Abi and her daughter and possibly grandchildren  who have a clinical diagnosis of NF1.      All of the relatives who have blood drawn would have to have been seen by a specialist in NF1 and have  a clinical diagnosis of NF1 to be accepted into the RNA study.     There would be no charge for this additional testing.    Abi said that she would contact her daughter and discuss this information. Her daughter  lives a couple hours from the [facility].    Plan:  1. Abi  understood these results and will follow up with Dr. Arellano.  2.  I  will mail her copy of the test results for NF1/SPRED1.  3.  She will call me back once she has discussed additional testing with her daughter.    Christine Chau MS [facility]  Genetic Counselor  [phone]

## 2018-10-22 PROBLEM — Q14.1 CONGENITAL RETINAL PIGMENT EPITHELIAL HYPERTROPHY: Status: ACTIVE | Noted: 2018-10-22

## 2019-09-28 ENCOUNTER — HEALTH MAINTENANCE LETTER (OUTPATIENT)
Age: 64
End: 2019-09-28

## 2019-10-26 ENCOUNTER — HEALTH MAINTENANCE LETTER (OUTPATIENT)
Age: 64
End: 2019-10-26

## 2020-03-15 ENCOUNTER — HEALTH MAINTENANCE LETTER (OUTPATIENT)
Age: 65
End: 2020-03-15

## 2021-01-09 ENCOUNTER — HEALTH MAINTENANCE LETTER (OUTPATIENT)
Age: 66
End: 2021-01-09

## 2021-05-08 ENCOUNTER — HEALTH MAINTENANCE LETTER (OUTPATIENT)
Age: 66
End: 2021-05-08

## 2021-10-23 ENCOUNTER — HEALTH MAINTENANCE LETTER (OUTPATIENT)
Age: 66
End: 2021-10-23

## 2022-06-04 ENCOUNTER — HEALTH MAINTENANCE LETTER (OUTPATIENT)
Age: 67
End: 2022-06-04

## 2022-10-09 ENCOUNTER — HEALTH MAINTENANCE LETTER (OUTPATIENT)
Age: 67
End: 2022-10-09

## 2023-06-10 ENCOUNTER — HEALTH MAINTENANCE LETTER (OUTPATIENT)
Age: 68
End: 2023-06-10

## 2023-10-28 ENCOUNTER — HEALTH MAINTENANCE LETTER (OUTPATIENT)
Age: 68
End: 2023-10-28

## 2024-04-18 ASSESSMENT — SLEEP AND FATIGUE QUESTIONNAIRES
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
## 2024-04-23 ENCOUNTER — MYC MEDICAL ADVICE (OUTPATIENT)
Dept: SLEEP MEDICINE | Facility: CLINIC | Age: 69
End: 2024-04-23

## 2024-04-23 ENCOUNTER — OFFICE VISIT (OUTPATIENT)
Dept: SLEEP MEDICINE | Facility: CLINIC | Age: 69
End: 2024-04-23
Payer: COMMERCIAL

## 2024-04-23 VITALS
OXYGEN SATURATION: 96 % | HEIGHT: 66 IN | DIASTOLIC BLOOD PRESSURE: 64 MMHG | SYSTOLIC BLOOD PRESSURE: 101 MMHG | HEART RATE: 74 BPM | BODY MASS INDEX: 30.95 KG/M2 | WEIGHT: 192.6 LBS

## 2024-04-23 DIAGNOSIS — G47.33 OSA ON CPAP: Primary | ICD-10-CM

## 2024-04-23 PROCEDURE — 99203 OFFICE O/P NEW LOW 30 MIN: CPT | Performed by: PHYSICIAN ASSISTANT

## 2024-04-23 NOTE — NURSING NOTE
"Chief Complaint   Patient presents with    Sleep Problem     Establish care, using CPAP        Initial /64   Pulse 74   Ht 1.664 m (5' 5.5\")   Wt 87.4 kg (192 lb 9.6 oz)   SpO2 96%   BMI 31.56 kg/m   Estimated body mass index is 31.56 kg/m  as calculated from the following:    Height as of this encounter: 1.664 m (5' 5.5\").    Weight as of this encounter: 87.4 kg (192 lb 9.6 oz).    Medication Reconciliation: complete  ESS 2  Neck circumference:  35 centimeters.  Karla Gonsales MA     "

## 2024-04-25 NOTE — TELEPHONE ENCOUNTER
Patient has decided she wants to order the new machine, order pended for provider consideration.

## 2024-04-25 NOTE — TELEPHONE ENCOUNTER
Order signed for a replacement auto CPAP 10-12 cm.  I do not believe we have a copy of her diagnostic study from [facility] dated 4/24/2017.  I will have Edi reach out to [facility] to try to obtain that.  She thinks she may have used [facility] in the past, so it may not prevent her from being able to get a replacement machine.  Bennett Goltz, PA-C

## 2024-05-20 ENCOUNTER — TELEPHONE (OUTPATIENT)
Dept: SLEEP MEDICINE | Facility: CLINIC | Age: 69
End: 2024-05-20

## 2024-05-20 NOTE — TELEPHONE ENCOUNTER
Reason for Call:  Appointment Request    Patient requesting this type of appt:  cpap complance    Requested provider:  any provider    Reason patient unable to be scheduled: Not within requested timeframe    When does patient want to be seen/preferred time:   days from 5/20/2024    Comments: needs to be seen within 30- 120 days from receiving cpap    Could we send this information to you in [place] or would you prefer to receive a phone call?:   Patient would prefer a phone call   Okay to leave a detailed message?: Yes at Home number on file [phone] (home)    Call taken on 5/20/2024 at 3:51 PM by uYki Grier

## 2024-05-23 NOTE — TELEPHONE ENCOUNTER
Patient is only willing to travel to [facility] or [facility] sleep [facility]. Currently there are no appointments sooner than patients scheduled appointment in person or virtual.  Patient will keep appointment and remain on the wait list.

## 2024-07-31 ASSESSMENT — SLEEP AND FATIGUE QUESTIONNAIRES
HOW LIKELY ARE YOU TO NOD OFF OR FALL ASLEEP WHILE WATCHING TV: MODERATE CHANCE OF DOZING
HOW LIKELY ARE YOU TO NOD OFF OR FALL ASLEEP WHILE SITTING QUIETLY AFTER LUNCH WITHOUT ALCOHOL: WOULD NEVER DOZE
HOW LIKELY ARE YOU TO NOD OFF OR FALL ASLEEP WHILE SITTING AND TALKING TO SOMEONE: WOULD NEVER DOZE
HOW LIKELY ARE YOU TO NOD OFF OR FALL ASLEEP WHILE LYING DOWN TO REST IN THE AFTERNOON WHEN CIRCUMSTANCES PERMIT: WOULD NEVER DOZE
HOW LIKELY ARE YOU TO NOD OFF OR FALL ASLEEP WHILE SITTING INACTIVE IN A PUBLIC PLACE: WOULD NEVER DOZE
HOW LIKELY ARE YOU TO NOD OFF OR FALL ASLEEP WHEN YOU ARE A PASSENGER IN A CAR FOR AN HOUR WITHOUT A BREAK: WOULD NEVER DOZE
HOW LIKELY ARE YOU TO NOD OFF OR FALL ASLEEP WHILE SITTING AND READING: SLIGHT CHANCE OF DOZING
HOW LIKELY ARE YOU TO NOD OFF OR FALL ASLEEP IN A CAR, WHILE STOPPED FOR A FEW MINUTES IN TRAFFIC: WOULD NEVER DOZE

## 2024-08-01 ENCOUNTER — OFFICE VISIT (OUTPATIENT)
Dept: SLEEP MEDICINE | Facility: CLINIC | Age: 69
End: 2024-08-01
Payer: COMMERCIAL

## 2024-08-01 VITALS
OXYGEN SATURATION: 96 % | SYSTOLIC BLOOD PRESSURE: 106 MMHG | BODY MASS INDEX: 30.86 KG/M2 | DIASTOLIC BLOOD PRESSURE: 71 MMHG | HEIGHT: 66 IN | HEART RATE: 64 BPM | WEIGHT: 192 LBS

## 2024-08-01 DIAGNOSIS — G47.33 OSA ON CPAP: Primary | ICD-10-CM

## 2024-08-01 PROCEDURE — 99213 OFFICE O/P EST LOW 20 MIN: CPT | Performed by: PHYSICIAN ASSISTANT

## 2024-08-01 NOTE — NURSING NOTE
"Chief Complaint   Patient presents with    CPAP Follow Up       Initial /71   Pulse 64   Ht 1.664 m (5' 5.5\")   Wt 87.1 kg (192 lb)   SpO2 96%   BMI 31.46 kg/m   Estimated body mass index is 31.46 kg/m  as calculated from the following:    Height as of this encounter: 1.664 m (5' 5.5\").    Weight as of this encounter: 87.1 kg (192 lb).    Medication Reconciliation: complete  ESS 3  Cherelle Miller MA   "

## 2024-08-01 NOTE — PROGRESS NOTES
"[facility]   Outpatient Sleep Medicine  Aug 1, 2024       Name: Abi Alex MRN# [id number]   Age: 69 year old YOB: 1955            Assessment and Plan:   1. MEAGHAN on CPAP  Patient's sleep apnea is adequately managed and well treated with current PAP settings [id number] with low residual AHI of 3.9 events per hour. Compliance is excellent. Tolerating PAP therapy well and benefits significantly from use. No indication to adjust settings today. She will continue nightly therapy. Prescription renewed for mask/supplies and will follow-up in about 1 year for annual PAP follow-up or sooner prn.  - Comprehensive DME       Chief Complaint      Chief Complaint   Patient presents with    CPAP Follow Up          History of Present Illness:     Abi Alex is a 69 year old female with neurofibromatosis, hypothyroid who presents to the clinic for follow-up of their mild to moderate obstructive sleep apnea treated with CPAP.    Patient initially presented to our sleep clinic and saw my colleague Bennett Goltz, PA-C in April 2024.  At that time was doing very well with CPAP, wanting to establish care and discuss replacement machine.  Initially diagnosed with MEAGHAN via PSG 4/24/2017 with AHI 20, RDI 24, 4% AHI was 7.    Patient recently set up with ResMed AirSense 11 auto CPAP 10-12 cm H2O on 5/20/2024.      Presents to my clinic today for insurance required follow-up after getting new machine. No concerns today, reports \"I love my new machine it's so much nicer and quieter\".     Overall, the patient rates their experience with PAP as 10 (0 poor, 10 great). Patient is using a nasal mask. The mask is comfortable. The mask is not leaking. They are not snoring with the mask on. They are not having gasp arousals.  They are not having significant oral/nasal dryness or epistaxis.  They are not having pain/skin breakdown. The pressure settings are comfortable.     Bedtime is typically 930-10:00 PM. " "Usually it takes about 10-15 minutes to fall asleep with the mask on. Wake time is typically 530-6:00 AM.  Patient is using PAP therapy 7.5 hours per night. The patient is usually getting 7 hours of sleep per night.  Does feel well rested in the morning.    ResMed Auto-PAP 10-12 cmH2O download (7/1/2024 - 7/30/2024):  30 total days of use. 0 nonuse days. 30 days with >4 hours use.  Average use 7 hours 37 minutes per day. Median Leak 0.1 L/min. 95%ile Leak 6.2 L/min. CPAP 95% pressure 10.9cm. AHI 3.9    SCALES:   INSOMNIA: Insomnia Severity Score: 5   SLEEPINESS: [place] Sleepiness Score: 3    Past medical/surgical history, family history, social history, medications and allergies were reviewed.           Physical Examination:   /71   Pulse 64   Ht 1.664 m (5' 5.5\")   Wt 87.1 kg (192 lb)   SpO2 96%   BMI 31.46 kg/m    General appearance: Awake, alert, cooperative. Well groomed. Sitting comfortably in chair. In no apparent distress.  HEENT: Head: Normocephalic, atraumatic. Eyes:Conjunctiva clear. Sclera normal. Nose: External appearance without deformity.   Pulmonary:  Able to speak easily in full sentences. No cough or wheeze.   Skin:  No rashes or significant lesions on visible skin.   Neurologic: Alert, oriented x3.   Psychiatric: Mood euthymic. Affect congruent with full range and intensity.      CC:  No Ref-Primary, Physician    Jess Conteh PA-C  Aug 1, 2024     [facility]  [address] , [place] [address]     [facility]  [address] [address]    Chart documentation was completed, in part, with [employer] voice-recognition software. Even though reviewed, some grammatical, spelling, and word errors may remain.  "

## 2025-03-22 ENCOUNTER — HEALTH MAINTENANCE LETTER (OUTPATIENT)
Age: 70
End: 2025-03-22

## 2025-05-25 ASSESSMENT — SLEEP AND FATIGUE QUESTIONNAIRES
HOW LIKELY ARE YOU TO NOD OFF OR FALL ASLEEP WHILE LYING DOWN TO REST IN THE AFTERNOON WHEN CIRCUMSTANCES PERMIT: WOULD NEVER DOZE
HOW LIKELY ARE YOU TO NOD OFF OR FALL ASLEEP WHILE SITTING AND TALKING TO SOMEONE: WOULD NEVER DOZE
HOW LIKELY ARE YOU TO NOD OFF OR FALL ASLEEP WHILE WATCHING TV: SLIGHT CHANCE OF DOZING
HOW LIKELY ARE YOU TO NOD OFF OR FALL ASLEEP IN A CAR, WHILE STOPPED FOR A FEW MINUTES IN TRAFFIC: WOULD NEVER DOZE
HOW LIKELY ARE YOU TO NOD OFF OR FALL ASLEEP WHEN YOU ARE A PASSENGER IN A CAR FOR AN HOUR WITHOUT A BREAK: WOULD NEVER DOZE
HOW LIKELY ARE YOU TO NOD OFF OR FALL ASLEEP WHILE SITTING INACTIVE IN A PUBLIC PLACE: WOULD NEVER DOZE
HOW LIKELY ARE YOU TO NOD OFF OR FALL ASLEEP WHILE SITTING AND READING: MODERATE CHANCE OF DOZING
HOW LIKELY ARE YOU TO NOD OFF OR FALL ASLEEP WHILE SITTING QUIETLY AFTER LUNCH WITHOUT ALCOHOL: SLIGHT CHANCE OF DOZING

## 2025-05-29 ENCOUNTER — OFFICE VISIT (OUTPATIENT)
Dept: SLEEP MEDICINE | Facility: CLINIC | Age: 70
End: 2025-05-29
Payer: COMMERCIAL

## 2025-05-29 VITALS
HEIGHT: 66 IN | BODY MASS INDEX: 30.7 KG/M2 | SYSTOLIC BLOOD PRESSURE: 108 MMHG | HEART RATE: 69 BPM | DIASTOLIC BLOOD PRESSURE: 70 MMHG | OXYGEN SATURATION: 98 % | WEIGHT: 191 LBS

## 2025-05-29 DIAGNOSIS — G47.33 OSA ON CPAP: Primary | ICD-10-CM

## 2025-05-29 NOTE — NURSING NOTE
"Chief Complaint   Patient presents with    CPAP Follow Up     Tightened mask but may need new mask components, possible RLS, taking iron supplement        Initial /70   Pulse 69   Ht 1.664 m (5' 5.51\")   Wt 86.6 kg (191 lb)   SpO2 98%   BMI 31.29 kg/m   Estimated body mass index is 31.29 kg/m  as calculated from the following:    Height as of this encounter: 1.664 m (5' 5.51\").    Weight as of this encounter: 86.6 kg (191 lb).    Medication Reconciliation: complete  ESS: 4  Neck circumference: 35 centimeters.    DME: YESSI Henderson, MIKHAIL      "

## 2025-05-29 NOTE — PROGRESS NOTES
"[facility]   Outpatient Sleep Medicine  May 29, 2025       Name: Abi Alex MRN# [id number]   Age: 70 year old YOB: 1955            Assessment and Plan:   1. MEAGHAN on CPAP (Primary)    Patient's sleep apnea is adequately managed and well treated with current PAP settings [id number] with low residual AHI of 4.2 events per hour. Compliance is excellent, using nightly for entirety of sleep duration. Continues to tolerate PAP well and benefits significantly from use. Agreed no changes to settings today. Prescription renewed for mask/supplies. Follow-up in 1 year for annual PAP follow-up visit or sooner prn.   - Comprehensive DME         Chief Complaint      Chief Complaint   Patient presents with    CPAP Follow Up     Tightened mask but may need new mask components, possible RLS, taking iron supplement           History of Present Illness:     Abi Alex is a 70 year old female with hypothyroid who presents to the clinic for follow-up of their mild obstructive sleep apnea treated with CPAP. Previously seen by Bennet Goltz PAC 4/2024    Prior sleep studies completed at [facility]:  Diagnostic PSG 4/19/2017 (170#, BMI 27.3) revealed AHI 7, RDI 24, REM AHI 28.1, supine AHI 23, oxygen nano 86%.  No abnormal movements or behaviors.  Titration PSG 5/14/2017 (171#, BMI 27.6) with CPAP 5 to 12 cm H2O showed CPAP 12 cm H2O most optimal though transitional central events.  No abnormal movements or behaviors.    Patient started CPAP therapy following 2017 testing and using ever since.  Most recently was set up with her Resmed Airsense 11 machine 5/20/2024.     Returns today for annual PAP follow-up visit. Overall, the patient rates their experience with PAP as 9 (0 poor, 10 great). Patient is using a nasal mask. The mask is comfortable. The mask was leaking - \"I got a notice in my email that they detected some leaking and recommended I tighten my mask. Which I did.\", now resolved. They are not " "snoring with the mask on. They are not having gasp arousals.  They are not having significant oral/nasal dryness or epistaxis.  They are not having pain/skin breakdown. The pressure settings are comfortable.     Bedtime is typically 9-10:00PM. Usually it takes about 15 minutes to fall asleep with the mask on. Wake time is typically 5:30-6:00AM.  Patient estimates she is using PAP therapy 7.5-8.5 hours per night. The patient estimates she is usually getting ~7 hours of sleep per night.    ResMed Auto-PAP 10-12 cmH2O download (4/23/25-5/22/25):  30 total days of use. 0 nonuse days. 30 days with >4 hours use.  Average use 7 hours 47 minutes per day. Median Leak 0.8 L/min. 95%ile Leak 8.6 L/min. Median pressure 10.2cm. CPAP 95% pressure 11.0cm. AHI 4.2    Denies classic restless leg sx today but  was noticing some kicking in sleep, started multivitamin with iron in it and has since resolved.     SCALES:   INSOMNIA: Insomnia Severity Score: 6   SLEEPINESS: [place] Sleepiness Score: 4    Past medical/surgical history, family history, social history, medications and allergies were reviewed.           Physical Examination:   /70   Pulse 69   Ht 1.664 m (5' 5.51\")   Wt 86.6 kg (191 lb)   SpO2 98%   BMI 31.29 kg/m    General appearance: Awake, alert, cooperative. Well groomed. Sitting comfortably in chair. In no apparent distress.  HEENT: Head: Normocephalic, atraumatic. Eyes:Conjunctiva clear. Sclera normal. Nose: External appearance without deformity.   Pulmonary:  Able to speak easily in full sentences. No cough or wheeze.   Skin:  No rashes or significant lesions on visible skin.   Neurologic: Alert, oriented x3.   Psychiatric: Mood euthymic. Affect congruent with full range and intensity.      CC:  No Ref-Primary, Physician    Jess Conteh PA-C  May 29, 2025     [facility] [place]  [address] , [place] [address]     [facility] [place]  [address] [address], [place] [address]    Chart documentation was completed, in part, with [employer] voice-recognition software. Even though reviewed, some grammatical, spelling, and word errors may remain.    24 minutes spent on day of encounter doing chart review, history and exam, documentation, and further activities as noted above